# Patient Record
Sex: FEMALE | Race: WHITE | HISPANIC OR LATINO | URBAN - METROPOLITAN AREA
[De-identification: names, ages, dates, MRNs, and addresses within clinical notes are randomized per-mention and may not be internally consistent; named-entity substitution may affect disease eponyms.]

---

## 2019-08-03 ENCOUNTER — EMERGENCY (EMERGENCY)
Facility: HOSPITAL | Age: 34
LOS: 1 days | Discharge: HOME | End: 2019-08-03
Attending: EMERGENCY MEDICINE
Payer: SELF-PAY

## 2019-08-03 VITALS
TEMPERATURE: 98 F | WEIGHT: 179.9 LBS | RESPIRATION RATE: 17 BRPM | HEART RATE: 91 BPM | HEIGHT: 65 IN | DIASTOLIC BLOOD PRESSURE: 98 MMHG | SYSTOLIC BLOOD PRESSURE: 183 MMHG | OXYGEN SATURATION: 100 %

## 2019-08-03 VITALS
RESPIRATION RATE: 16 BRPM | SYSTOLIC BLOOD PRESSURE: 163 MMHG | HEART RATE: 85 BPM | OXYGEN SATURATION: 100 % | DIASTOLIC BLOOD PRESSURE: 98 MMHG

## 2019-08-03 PROCEDURE — 71045 X-RAY EXAM CHEST 1 VIEW: CPT

## 2019-08-03 PROCEDURE — 99284 EMERGENCY DEPT VISIT MOD MDM: CPT | Mod: 25

## 2019-08-03 PROCEDURE — 73030 X-RAY EXAM OF SHOULDER: CPT

## 2019-08-03 PROCEDURE — 71045 X-RAY EXAM CHEST 1 VIEW: CPT | Mod: 26

## 2019-08-03 PROCEDURE — 73030 X-RAY EXAM OF SHOULDER: CPT | Mod: 26,RT

## 2019-08-03 PROCEDURE — 73110 X-RAY EXAM OF WRIST: CPT | Mod: 26,RT

## 2019-08-03 PROCEDURE — 73110 X-RAY EXAM OF WRIST: CPT

## 2019-08-03 PROCEDURE — 99283 EMERGENCY DEPT VISIT LOW MDM: CPT

## 2019-08-03 RX ORDER — IBUPROFEN 200 MG
600 TABLET ORAL ONCE
Refills: 0 | Status: COMPLETED | OUTPATIENT
Start: 2019-08-03 | End: 2019-08-03

## 2019-08-03 RX ORDER — ACETAMINOPHEN 500 MG
975 TABLET ORAL ONCE
Refills: 0 | Status: COMPLETED | OUTPATIENT
Start: 2019-08-03 | End: 2019-08-03

## 2019-08-03 RX ADMIN — Medication 975 MILLIGRAM(S): at 15:51

## 2019-08-03 RX ADMIN — Medication 600 MILLIGRAM(S): at 15:52

## 2019-08-03 RX ADMIN — Medication 975 MILLIGRAM(S): at 15:52
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
## 2019-08-03 NOTE — ED PROVIDER NOTE - NSFOLLOWUPINSTRUCTIONS_ED_ALL_ED_FT
Hydrate.  Ice to pain area for 2days.  Take Tylenol 500mg or 650mg every 6hours for pain as needed.  Motrin (Advil or Ibuprofen) 600mg every 8hours for pain with food.  Follow up with your Dr. for reevaluation in 2-3days, call Monday for an appointment.  Return for any concerns or worsening symptoms. Hydrate.  Ice to pain area for 2days.  Take Tylenol 500mg or 650mg every 6hours for pain as needed.  Motrin (Advil or Ibuprofen) 600mg every 8hours for pain with food.  Follow up with your Dr. for reevaluation and repeat your blood pressure in 2-3days, call Monday for an appointment.  Return for any concerns or worsening symptoms.
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
## 2019-08-03 NOTE — ED PROVIDER NOTE - PHYSICAL EXAMINATION
NAD, Hypertensive, Afebrile, + PERRL with full EOMs. No facial or scalp swelling. No spinal midline tender. + Right cervical trapezium tender without swelling or lesions. + right para T/L spine tender without swelling or lesions. No RIB or CVA tender. Lungs clear. + right upper chest wall superficial abrasions without obvious swelling. ABD soft, non tender. + Anterior shoulder and right wrist generalized tender without swelling or lesions. Full ROMs of joints. No pelvic or hip tender with full ROMS of hips. Neuro- intact.